# Patient Record
Sex: MALE | Race: WHITE | URBAN - METROPOLITAN AREA
[De-identification: names, ages, dates, MRNs, and addresses within clinical notes are randomized per-mention and may not be internally consistent; named-entity substitution may affect disease eponyms.]

---

## 2018-09-10 ENCOUNTER — THERAPY VISIT (OUTPATIENT)
Dept: PHYSICAL THERAPY | Facility: CLINIC | Age: 58
End: 2018-09-10
Payer: COMMERCIAL

## 2018-09-10 DIAGNOSIS — M25.561 RIGHT KNEE PAIN: Primary | ICD-10-CM

## 2018-09-10 PROCEDURE — 97110 THERAPEUTIC EXERCISES: CPT | Mod: GP | Performed by: PHYSICAL THERAPIST

## 2018-09-10 PROCEDURE — 97161 PT EVAL LOW COMPLEX 20 MIN: CPT | Mod: GP | Performed by: PHYSICAL THERAPIST

## 2018-09-10 ASSESSMENT — ACTIVITIES OF DAILY LIVING (ADL)
KNEEL ON THE FRONT OF YOUR KNEE: ACTIVITY IS MINIMALLY DIFFICULT
HOW_WOULD_YOU_RATE_THE_OVERALL_FUNCTION_OF_YOUR_KNEE_DURING_YOUR_USUAL_DAILY_ACTIVITIES?: NEARLY NORMAL
SWELLING: THE SYMPTOM AFFECTS MY ACTIVITY SLIGHTLY
WEAKNESS: THE SYMPTOM AFFECTS MY ACTIVITY SLIGHTLY
KNEE_ACTIVITY_OF_DAILY_LIVING_SUM: 47
HOW_WOULD_YOU_RATE_THE_CURRENT_FUNCTION_OF_YOUR_KNEE_DURING_YOUR_USUAL_DAILY_ACTIVITIES_ON_A_SCALE_FROM_0_TO_100_WITH_100_BEING_YOUR_LEVEL_OF_KNEE_FUNCTION_PRIOR_TO_YOUR_INJURY_AND_0_BEING_THE_INABILITY_TO_PERFORM_ANY_OF_YOUR_USUAL_DAILY_ACTIVITIES?: 75
GIVING WAY, BUCKLING OR SHIFTING OF KNEE: THE SYMPTOM AFFECTS MY ACTIVITY SLIGHTLY
RISE FROM A CHAIR: ACTIVITY IS NOT DIFFICULT
STIFFNESS: THE SYMPTOM AFFECTS MY ACTIVITY MODERATELY
LIMPING: THE SYMPTOM AFFECTS MY ACTIVITY SLIGHTLY
KNEE_ACTIVITY_OF_DAILY_LIVING_SCORE: 67.14
SQUAT: ACTIVITY IS SOMEWHAT DIFFICULT
SIT WITH YOUR KNEE BENT: ACTIVITY IS NOT DIFFICULT
GO DOWN STAIRS: ACTIVITY IS SOMEWHAT DIFFICULT
PAIN: THE SYMPTOM AFFECTS MY ACTIVITY MODERATELY
STAND: ACTIVITY IS NOT DIFFICULT
GO UP STAIRS: ACTIVITY IS SOMEWHAT DIFFICULT
AS_A_RESULT_OF_YOUR_KNEE_INJURY,_HOW_WOULD_YOU_RATE_YOUR_CURRENT_LEVEL_OF_DAILY_ACTIVITY?: NEARLY NORMAL
RAW_SCORE: 47
WALK: ACTIVITY IS SOMEWHAT DIFFICULT

## 2018-09-10 NOTE — MR AVS SNAPSHOT
After Visit Summary   9/10/2018    Georgi Kaye    MRN: [id number]           Patient Information     Date Of Birth          1960        Visit Information        Provider Department      9/10/2018 2:10 PM Kendy Paula, PT [facility]        Today's Diagnoses     Right knee pain    -  1       Follow-ups after your visit        Your next 10 appointments already scheduled     Sep 12, 2018  2:50 PM CDT   RODRIGO Extremity with Stormy Bowers, PTA   [facility] ([facility]  )    [address]  [facility] [address]   [phone]            Sep 18, 2018  2:50 PM CDT   RODRIGO Extremity with Stormy Bowers, PTA   [facility] ([facility]  )    [address]  [facility] [address]   [phone]            Sep 20, 2018  1:30 PM CDT   RODRIGO Extremity with Kendyerma Paula, PT   [facility] ([facility]  )    [address]  [facility] [address] [phone]            Sep 25, 2018  2:50 PM CDT   RODRIGO Extremity with Stormy Bowers, PTA   [facility] ([facility]  )    [address]  [facility] [address] [phone]              Who to contact     If you have questions or need follow up information about today's clinic visit or your schedule please contact [facility] directly at [phone].  Normal or non-critical lab and imaging results will be communicated to you by MyChart, letter or phone within 4 business days after the clinic has received the results. If you do not hear from us within 7 days, please contact the clinic through MyChart or phone. If you have a critical or abnormal lab result, we will notify you by phone as soon as possible.  Submit refill requests through [employer]Â® or call your pharmacy and they will forward the refill request to us. Please  "allow 3 business days for your refill to be completed.          Additional Information About Your Visit        MyChart Information     [employer] lets you send messages to your doctor, view your test results, renew your prescriptions, schedule appointments and more. To sign up, go to [email or web address] . Click on \"Log in\" on the left side of the screen, which will take you to the Welcome page. Then click on \"Sign up Now\" on the right side of the page.     You will be asked to enter the access code listed below, as well as some personal information. Please follow the directions to create your username and password.     Your access code is: [email or web address]  Expires: 2018  3:19 PM     Your access code will  in 90 days. If you need help or a new code, please call your [facility] clinic or [phone].        Care EveryWhere ID     This is your Care EveryWhere ID. This could be used by other organizations to access your [facility] medical records  [id number]         Blood Pressure from Last 3 Encounters:   04 118/72   02 112/72    Weight from Last 3 Encounters:   04 68 kg (150 lb)   02 67.6 kg (149 lb)              We Performed the Following     HC PT EVAL, LOW COMPLEXITY     RODRIGO INITIAL EVAL REPORT     THERAPEUTIC EXERCISES        Primary Care Provider Office Phone # Fax #    Dru Jose Alberto Mcintyre [phone]       EMERGENCY DEPARTMENT [address] [address]        Equal Access to Services     [facility] : Hadii aad ku hadasho Soomaali, waaxda luqadaha, qaybta kaalmada adeegyada, waxay val perdue . So [facility] [phone].    ATENCIÓN: Si habla español, tiene a quiñones disposición servicios gratuitos de asistencia lingüística. Llame al [phone].    We comply with applicable federal civil rights laws and [place] laws. We do not discriminate on the basis of race, color, national origin, age, disability, sex, sexual orientation, " or gender identity.            Thank you!     Thank you for choosing [facility] THER[facility]  for your care. Our goal is always to provide you with excellent care. Hearing back from our patients is one way we can continue to improve our services. Please take a few minutes to complete the written survey that you may receive in the mail after your visit with us. Thank you!             Your Updated Medication List - Protect others around you: Learn how to safely use, store and throw away your medicines at www.disposemymeds.org.          This list is accurate as of 9/10/18  3:19 PM.  Always use your most recent med list.                   Brand Name Dispense Instructions for use Diagnosis    NO ACTIVE MEDICATIONS     0    .

## 2018-09-10 NOTE — LETTER
[facility]  [address]  [facility] [address]  [phone]    September 10, 2018    Re: Georgi Kaye   :   1960  MRN:  [id number]   REFERRING PHYSICIAN:   Zak Smith    [facility]  Date of Initial Evaluation: 9/10/18  Visits:  Rxs Used: 1  Reason for Referral:  Right knee pain    EVALUATION SUMMARY    [facility] Initial Evaluation  Subjective:  Patient is a 58 year old male presenting with rehab right knee hpi. Georgi Kaye is a 58 year old male with a right knee condition.  Condition occurred: at home. This is a new condition  Right knee pain started 9/3/18 for unknown reasons.  Has been walking more on a hard surface that day.  Is wearing a knee brace which he feels helps keep the knee more stable.      Patient reports pain:  Lateral and in the joint. Pain is described as aching and sharp and is intermittent and reported as 4/10.  Associated symptoms:  Catching and locking. Pain is the same all the time.  Symptoms are exacerbated by activity, ascending stairs, descending stairs, bending/squatting and walking and relieved by bracing/immobilizing.  Since onset symptoms are unchanged.  Special tests:  X-ray. General health as reported by patient is excellent.      Pertinent medical history includes:  None.  Medical allergies: no.  Other surgeries include:  Other (appendix).  Current medications:  None as reported by the patient.  Current occupation is .  Patient is working in normal job without restrictions.      Barriers include:  None as reported by the patient.    Red flags:  None as reported by the patient.                 Objective:       Knee Evaluation:  ROM:    AROM  Hyperextension:  Left:  13    Right: 10  Flexion: Left: 145    Right: 133    Strength:   Extension:  Left: 5/5   Pain:      Right: 4-/5   Pain:  Flexion:  Left: 5/5   Pain:      Right: 5/5   Pain:     Assessment/Plan:    Patient is a 58 year old male with right side knee complaints.    Patient has the following significant findings with corresponding treatment plan.                  Diagnosis 1:  Right knee  Pain -  hot/cold therapy, manual therapy, self management, education and directional preference exercise  Decreased ROM/flexibility - manual therapy, therapeutic exercise and therapeutic activity  Decreased joint mobility - manual therapy and therapeutic exercise  Decreased strength - therapeutic exercise and therapeutic activities  Impaired muscle performance - neuro re-education  Decreased function - therapeutic activities    Therapy Evaluation Codes:   1) History comprised of:   Personal factors that impact the plan of care:      None.    Comorbidity factors that impact the plan of care are:      None.     Medications impacting care: None.  2) Examination of Body Systems comprised of:   Body structures and functions that impact the plan of care:      Knee.   Activity limitations that impact the plan of care are:      Stairs and Walking.  3) Clinical presentation characteristics are:   Stable/Uncomplicated.  4) Decision-Making    Low complexity using standardized patient assessment instrument and/or measureable assessment of functional outcome.  Cumulative Therapy Evaluation is: Low complexity.    Previous and current functional limitations:  (See Goal Flow Sheet for this information)    Short term and Long term goals: (See Goal Flow Sheet for this information)     Communication ability:  Patient appears to be able to clearly communicate and understand verbal and written communication and follow directions correctly.    Treatment Explanation - The following has been discussed with the patient:   RX ordered/plan of care  Anticipated outcomes  Possible risks and side effects    This patient would benefit from PT intervention to resume normal activities.   Rehab potential is good.  Frequency:  1 X week, once  daily  Duration:  for 10 weeks  Discharge Plan:  Achieve all LTG.  Independent in home treatment program.  Reach maximal therapeutic benefit.    Thank you for your referral.      INQUIRIES  Therapist: Kendy Paula DPT  [facility] PHYSICAL THERAP[facility]  [address]  [facility] [address]  Phone: [phone]  Fax: [phone]

## 2018-09-10 NOTE — PROGRESS NOTES
[facility] Initial Evaluation  Subjective:  Patient is a 58 year old male presenting with rehab right knee hpi.   Georgi Kaye is a 58 year old male with a right knee condition.    Condition occurred: at home.  This is a new condition  Right knee pain started 9/3/18 for unknown reasons.  Has been walking more on a hard surface that day.  Is wearing a knee brace which he feels helps keep the knee more stable..    Patient reports pain:  Lateral and in the joint.    Pain is described as aching and sharp and is intermittent and reported as 4/10.  Associated symptoms:  Catching and locking. Pain is the same all the time.  Symptoms are exacerbated by activity, ascending stairs, descending stairs, bending/squatting and walking and relieved by bracing/immobilizing.  Since onset symptoms are unchanged.  Special tests:  X-ray.      General health as reported by patient is excellent.  Pertinent medical history includes:  None.  Medical allergies: no.  Other surgeries include:  Other (appendix).  Current medications:  None as reported by the patient.  Current occupation is .  Patient is working in normal job without restrictions.      Barriers include:  None as reported by the patient.    Red flags:  None as reported by the patient.                        Objective:  System                                                Knee Evaluation:  ROM:    AROM    Hyperextension:  Left:  13    Right: 10    Flexion: Left: 145    Right: 133        Strength:     Extension:  Left: 5/5   Pain:      Right: 4-/5   Pain:  Flexion:  Left: 5/5   Pain:      Right: 5/5   Pain:                        General     ROS    Assessment/Plan:    Patient is a 58 year old male with right side knee complaints.    Patient has the following significant findings with corresponding treatment plan.                Diagnosis 1:  Right knee  Pain -  hot/cold therapy, manual therapy, self management, education and directional  preference exercise  Decreased ROM/flexibility - manual therapy, therapeutic exercise and therapeutic activity  Decreased joint mobility - manual therapy and therapeutic exercise  Decreased strength - therapeutic exercise and therapeutic activities  Impaired muscle performance - neuro re-education  Decreased function - therapeutic activities    Therapy Evaluation Codes:   1) History comprised of:   Personal factors that impact the plan of care:      None.    Comorbidity factors that impact the plan of care are:      None.     Medications impacting care: None.  2) Examination of Body Systems comprised of:   Body structures and functions that impact the plan of care:      Knee.   Activity limitations that impact the plan of care are:      Stairs and Walking.  3) Clinical presentation characteristics are:   Stable/Uncomplicated.  4) Decision-Making    Low complexity using standardized patient assessment instrument and/or measureable assessment of functional outcome.  Cumulative Therapy Evaluation is: Low complexity.    Previous and current functional limitations:  (See Goal Flow Sheet for this information)    Short term and Long term goals: (See Goal Flow Sheet for this information)     Communication ability:  Patient appears to be able to clearly communicate and understand verbal and written communication and follow directions correctly.  Treatment Explanation - The following has been discussed with the patient:   RX ordered/plan of care  Anticipated outcomes  Possible risks and side effects  This patient would benefit from PT intervention to resume normal activities.   Rehab potential is good.    Frequency:  1 X week, once daily  Duration:  for 10 weeks  Discharge Plan:  Achieve all LTG.  Independent in home treatment program.  Reach maximal therapeutic benefit.    Please refer to the daily flowsheet for treatment today, total treatment time and time spent performing 1:1 timed codes.

## 2018-09-12 ENCOUNTER — THERAPY VISIT (OUTPATIENT)
Dept: PHYSICAL THERAPY | Facility: CLINIC | Age: 58
End: 2018-09-12
Payer: COMMERCIAL

## 2018-09-12 DIAGNOSIS — M25.561 RIGHT KNEE PAIN: ICD-10-CM

## 2018-09-12 PROCEDURE — 97110 THERAPEUTIC EXERCISES: CPT | Mod: GP | Performed by: PHYSICAL THERAPY ASSISTANT

## 2018-09-12 PROCEDURE — 97140 MANUAL THERAPY 1/> REGIONS: CPT | Mod: GP | Performed by: PHYSICAL THERAPY ASSISTANT

## 2018-09-12 PROCEDURE — 97035 APP MDLTY 1+ULTRASOUND EA 15: CPT | Mod: GP | Performed by: PHYSICAL THERAPY ASSISTANT

## 2018-09-18 ENCOUNTER — THERAPY VISIT (OUTPATIENT)
Dept: PHYSICAL THERAPY | Facility: CLINIC | Age: 58
End: 2018-09-18
Payer: COMMERCIAL

## 2018-09-18 DIAGNOSIS — M25.561 RIGHT KNEE PAIN: ICD-10-CM

## 2018-09-18 PROCEDURE — 97110 THERAPEUTIC EXERCISES: CPT | Mod: GP | Performed by: PHYSICAL THERAPY ASSISTANT

## 2018-09-18 PROCEDURE — 97140 MANUAL THERAPY 1/> REGIONS: CPT | Mod: GP | Performed by: PHYSICAL THERAPY ASSISTANT

## 2018-09-18 PROCEDURE — 97035 APP MDLTY 1+ULTRASOUND EA 15: CPT | Mod: GP | Performed by: PHYSICAL THERAPY ASSISTANT

## 2018-09-20 ENCOUNTER — THERAPY VISIT (OUTPATIENT)
Dept: PHYSICAL THERAPY | Facility: CLINIC | Age: 58
End: 2018-09-20
Payer: COMMERCIAL

## 2018-09-20 DIAGNOSIS — M25.561 RIGHT KNEE PAIN: ICD-10-CM

## 2018-09-20 PROCEDURE — 97140 MANUAL THERAPY 1/> REGIONS: CPT | Mod: GP | Performed by: PHYSICAL THERAPIST

## 2018-09-20 PROCEDURE — 97035 APP MDLTY 1+ULTRASOUND EA 15: CPT | Mod: GP | Performed by: PHYSICAL THERAPIST

## 2018-09-20 PROCEDURE — 97110 THERAPEUTIC EXERCISES: CPT | Mod: GP | Performed by: PHYSICAL THERAPIST

## 2018-09-25 ENCOUNTER — THERAPY VISIT (OUTPATIENT)
Dept: PHYSICAL THERAPY | Facility: CLINIC | Age: 58
End: 2018-09-25
Payer: COMMERCIAL

## 2018-09-25 DIAGNOSIS — M25.561 RIGHT KNEE PAIN: ICD-10-CM

## 2018-09-25 PROCEDURE — 97140 MANUAL THERAPY 1/> REGIONS: CPT | Mod: GP | Performed by: PHYSICAL THERAPY ASSISTANT

## 2018-09-25 PROCEDURE — 97035 APP MDLTY 1+ULTRASOUND EA 15: CPT | Mod: GP | Performed by: PHYSICAL THERAPY ASSISTANT

## 2018-09-25 PROCEDURE — 97110 THERAPEUTIC EXERCISES: CPT | Mod: GP | Performed by: PHYSICAL THERAPY ASSISTANT

## 2018-10-03 ENCOUNTER — THERAPY VISIT (OUTPATIENT)
Dept: PHYSICAL THERAPY | Facility: CLINIC | Age: 58
End: 2018-10-03
Payer: COMMERCIAL

## 2018-10-03 DIAGNOSIS — M25.561 RIGHT KNEE PAIN: ICD-10-CM

## 2018-10-03 PROCEDURE — 97140 MANUAL THERAPY 1/> REGIONS: CPT | Mod: GP | Performed by: PHYSICAL THERAPY ASSISTANT

## 2018-10-03 PROCEDURE — 97035 APP MDLTY 1+ULTRASOUND EA 15: CPT | Mod: GP | Performed by: PHYSICAL THERAPY ASSISTANT

## 2018-10-03 PROCEDURE — 97110 THERAPEUTIC EXERCISES: CPT | Mod: GP | Performed by: PHYSICAL THERAPY ASSISTANT
